# Patient Record
Sex: FEMALE | Race: WHITE | NOT HISPANIC OR LATINO | Employment: PART TIME | URBAN - METROPOLITAN AREA
[De-identification: names, ages, dates, MRNs, and addresses within clinical notes are randomized per-mention and may not be internally consistent; named-entity substitution may affect disease eponyms.]

---

## 2022-03-18 ENCOUNTER — APPOINTMENT (EMERGENCY)
Dept: RADIOLOGY | Facility: HOSPITAL | Age: 22
End: 2022-03-18
Payer: COMMERCIAL

## 2022-03-18 ENCOUNTER — HOSPITAL ENCOUNTER (EMERGENCY)
Facility: HOSPITAL | Age: 22
Discharge: HOME | End: 2022-03-19
Attending: EMERGENCY MEDICINE | Admitting: EMERGENCY MEDICINE
Payer: COMMERCIAL

## 2022-03-18 VITALS
SYSTOLIC BLOOD PRESSURE: 126 MMHG | WEIGHT: 147 LBS | RESPIRATION RATE: 16 BRPM | OXYGEN SATURATION: 100 % | HEART RATE: 98 BPM | TEMPERATURE: 97.8 F | DIASTOLIC BLOOD PRESSURE: 71 MMHG

## 2022-03-18 DIAGNOSIS — S82.899A ANKLE FRACTURE: Primary | ICD-10-CM

## 2022-03-18 PROCEDURE — 99282 EMERGENCY DEPT VISIT SF MDM: CPT | Performed by: EMERGENCY MEDICINE

## 2022-03-18 PROCEDURE — 99283 EMERGENCY DEPT VISIT LOW MDM: CPT

## 2022-03-18 PROCEDURE — 73610 X-RAY EXAM OF ANKLE: CPT

## 2022-03-18 RX ORDER — IBUPROFEN 400 MG/1
400 TABLET ORAL ONCE
Status: COMPLETED | OUTPATIENT
Start: 2022-03-18 | End: 2022-03-18

## 2022-03-18 RX ORDER — ACETAMINOPHEN 325 MG/1
650 TABLET ORAL ONCE
Status: COMPLETED | OUTPATIENT
Start: 2022-03-18 | End: 2022-03-18

## 2022-03-18 RX ADMIN — ACETAMINOPHEN 650 MG: 325 TABLET ORAL at 23:33

## 2022-03-18 RX ADMIN — DICLOFENAC SODIUM 4 G: 10 GEL TOPICAL at 23:32

## 2022-03-18 RX ADMIN — IBUPROFEN 400 MG: 400 TABLET, FILM COATED ORAL at 23:33

## 2022-03-19 NOTE — ED PROVIDER NOTES
History  Chief Complaint   Patient presents with    Ankle Injury     Patient reports falling while at freefall and landing on left ankle  Right ankle swollen and cool to touch  <2 cap refill  HPI     Patient is a pleasant 19 yof who present right ankle pain  No f/c/s  No other complaints      + right ankle pain, achi, severe, worse with palpation  Xray shows fracture  Absolutely no knee pain to suggest proximal fracture  Static Splint Application and Check  Splint was applied on the same date as the visit  Consent: Verbal consent obtained  Risks and benefits: risks, benefits and alternatives were discussed  Consent given by: patient or family   Patient understanding: patient states understanding of the procedure being performed   Patient consent: the patient's understanding of the procedure matches consent given   Patient identity confirmed: verbally with patient and arm band   Location details: right ankle  Splint type: posterior and stirrup  Post-procedure: The splinted body part was neurovascularly unchanged following the procedure  Patient tolerance: Patient tolerated the procedure well with no immediate complications  MDM distal fib fx, given splint/crutches and followup ortho  None       History reviewed  No pertinent past medical history  History reviewed  No pertinent surgical history  History reviewed  No pertinent family history  I have reviewed and agree with the history as documented  E-Cigarette/Vaping     E-Cigarette/Vaping Substances    Nicotine Yes     THC No     Flavoring Yes      Social History     Tobacco Use    Smoking status: Never Smoker    Smokeless tobacco: Never Used   Vaping Use    Vaping Use: Not on file   Substance Use Topics    Alcohol use: Not Currently    Drug use: Not Currently       Review of Systems   Musculoskeletal: Positive for arthralgias  All other systems reviewed and are negative        Physical Exam  Physical Exam  Vitals and nursing note reviewed  Constitutional:       Appearance: She is well-developed  HENT:      Head: Normocephalic and atraumatic  Right Ear: External ear normal       Left Ear: External ear normal    Eyes:      Conjunctiva/sclera: Conjunctivae normal    Neck:      Vascular: No JVD  Trachea: No tracheal deviation  Cardiovascular:      Rate and Rhythm: Normal rate and regular rhythm  Heart sounds: Normal heart sounds  Pulmonary:      Effort: Pulmonary effort is normal  No respiratory distress  Breath sounds: No wheezing or rales  Abdominal:      Palpations: Abdomen is soft  Tenderness: There is no abdominal tenderness  There is no guarding or rebound  Musculoskeletal:         General: Tenderness and signs of injury present  Cervical back: Normal range of motion and neck supple  Right lower leg: Edema present  Skin:     General: Skin is warm and dry  Findings: No erythema or rash  Neurological:      General: No focal deficit present  Mental Status: She is alert and oriented to person, place, and time  Motor: No weakness  Psychiatric:         Behavior: Behavior normal          Thought Content:  Thought content normal          Vital Signs  ED Triage Vitals   Temperature Pulse Respirations Blood Pressure SpO2   03/18/22 2226 03/18/22 2226 03/18/22 2226 03/18/22 2226 03/18/22 2226   97 8 °F (36 6 °C) 98 16 126/71 100 %      Temp Source Heart Rate Source Patient Position - Orthostatic VS BP Location FiO2 (%)   03/18/22 2226 03/18/22 2226 -- 03/18/22 2226 --   Oral Monitor  Left arm       Pain Score       03/18/22 2333       8           Vitals:    03/18/22 2226   BP: 126/71   Pulse: 98         Visual Acuity      ED Medications  Medications   Diclofenac Sodium (VOLTAREN) 1 % topical gel 4 g (4 g Topical Given 3/18/22 2332)   acetaminophen (TYLENOL) tablet 650 mg (650 mg Oral Given 3/18/22 2333)   ibuprofen (MOTRIN) tablet 400 mg (400 mg Oral Given 3/18/22 2333)       Diagnostic Studies  Results Reviewed     None                 XR ankle 3+ views RIGHT    (Results Pending)              Procedures  Procedures         ED Course                               SBIRT 22yo+      Most Recent Value   SBIRT (24 yo +)    In order to provide better care to our patients, we are screening all of our patients for alcohol and drug use  Would it be okay to ask you these screening questions? Unable to answer at this time Filed at: 03/19/2022 0043                    MDM    Disposition  Final diagnoses: Ankle fracture     Time reflects when diagnosis was documented in both MDM as applicable and the Disposition within this note     Time User Action Codes Description Comment    3/19/2022 12:45 AM Sarabjit CIFUENTES Add [[id number] [id number]] Ankle fracture       ED Disposition     ED Disposition Condition Date/Time Comment    Discharge Stable Sat Mar 19, 2022 12:45 AM Cecilia Patterson [address] discharge to home/self care  Follow-up Information     Follow up With Specialties Details Why Contact Info Additional [address] [facility] Orthopedic Surgery Schedule an appointment as soon as possible for a visit   Anika [address] [facility] [facility], [address] [place], [place], [address] [facility]          There are no discharge medications for this patient  No discharge procedures on file      PDMP Review     None          ED Provider  Electronically Signed by           Bill Pulliam MD  03/19/22 2104

## 2022-03-21 ENCOUNTER — TELEPHONE (OUTPATIENT)
Dept: OBGYN CLINIC | Facility: HOSPITAL | Age: 22
End: 2022-03-21

## 2022-03-21 NOTE — TELEPHONE ENCOUNTER
I received a Care Request from patient to schedule for:      Where Does it Hurt? broken ankle, referred from [facility]  Are you considering joint replacement? No   Are you seeking a second opinion? No  If yes, who is your doctor? I lvm to cb to schedule